# Patient Record
Sex: FEMALE | URBAN - METROPOLITAN AREA
[De-identification: names, ages, dates, MRNs, and addresses within clinical notes are randomized per-mention and may not be internally consistent; named-entity substitution may affect disease eponyms.]

---

## 2023-11-29 ENCOUNTER — APPOINTMENT (RX ONLY)
Dept: URBAN - METROPOLITAN AREA CLINIC 66 | Facility: CLINIC | Age: 63
Setting detail: DERMATOLOGY
End: 2023-11-29

## 2023-11-29 DIAGNOSIS — Z41.9 ENCOUNTER FOR PROCEDURE FOR PURPOSES OTHER THAN REMEDYING HEALTH STATE, UNSPECIFIED: ICD-10-CM

## 2023-11-29 PROCEDURE — ? COSMETIC CONSULTATION: BOTOX

## 2023-11-29 PROCEDURE — ? COSMETIC CONSULTATION: FILLERS

## 2023-11-29 PROCEDURE — ? COSMETIC CONSULTATION: DYSPORT

## 2023-11-29 PROCEDURE — ? DYSPORT

## 2023-11-29 ASSESSMENT — LOCATION DETAILED DESCRIPTION DERM
LOCATION DETAILED: LEFT CENTRAL BUCCAL CHEEK
LOCATION DETAILED: LEFT CHIN
LOCATION DETAILED: RIGHT CENTRAL BUCCAL CHEEK

## 2023-11-29 ASSESSMENT — LOCATION SIMPLE DESCRIPTION DERM
LOCATION SIMPLE: LEFT CHEEK
LOCATION SIMPLE: RIGHT CHEEK
LOCATION SIMPLE: CHIN

## 2023-11-29 ASSESSMENT — LOCATION ZONE DERM: LOCATION ZONE: FACE

## 2023-11-29 NOTE — PROCEDURE: DYSPORT
Detail Level: Detailed
Lcl Root Units: 0
Price (Use Numbers Only, No Special Characters Or $): 12
Show Right And Left Brow Units: No
Show Lateral Platysmal Band Units: Yes
Expiration Date (Month Year): 06/30/2025
Additional Area 4 Location: levator labii superioris
Dilution (U/0.1 Cc): 2.5
Additional Area 3 Location: superior orbicularis oris
Lot #: [id number] BOX C
Additional Area 2 Location: inferior orbicularis oculi
Depressor Anguli Oris Units: 4
Post-Care Instructions: No strenuous activity for 24 hours. Do not lie down for 4 hours post-injection. Do not massage or apply pressure to areas of injection. \\nFollow up in 2 weeks for re-evaluation and possible touch-up. Call or come in prior to that time with any questions or concerns.
Additional Area 6 Location: [facility]
Consent: All questions answered.\\n\\n10 units / 30 units DYS
Additional Area 5 Units: 6
Additional Area 5 Location: [facility]

## 2024-04-10 ENCOUNTER — APPOINTMENT (RX ONLY)
Dept: URBAN - METROPOLITAN AREA CLINIC 66 | Facility: CLINIC | Age: 64
Setting detail: DERMATOLOGY
End: 2024-04-10

## 2024-04-10 DIAGNOSIS — Z41.9 ENCOUNTER FOR PROCEDURE FOR PURPOSES OTHER THAN REMEDYING HEALTH STATE, UNSPECIFIED: ICD-10-CM

## 2024-04-10 DIAGNOSIS — I78.8 OTHER DISEASES OF CAPILLARIES: ICD-10-CM

## 2024-04-10 PROCEDURE — ? ELECTRODESICCATION (COSMETIC)

## 2024-04-10 PROCEDURE — ? JUVEDERM VOLUMA XC INJECTION

## 2024-04-10 PROCEDURE — ? DYSPORT

## 2024-04-10 ASSESSMENT — LOCATION SIMPLE DESCRIPTION DERM
LOCATION SIMPLE: RIGHT CHEEK
LOCATION SIMPLE: CHIN
LOCATION SIMPLE: GLABELLA
LOCATION SIMPLE: LEFT CHEEK

## 2024-04-10 ASSESSMENT — LOCATION DETAILED DESCRIPTION DERM
LOCATION DETAILED: GLABELLA
LOCATION DETAILED: LEFT MEDIAL MALAR CHEEK
LOCATION DETAILED: RIGHT CENTRAL MALAR CHEEK
LOCATION DETAILED: LEFT CENTRAL MALAR CHEEK
LOCATION DETAILED: LEFT CHIN

## 2024-04-10 ASSESSMENT — LOCATION ZONE DERM: LOCATION ZONE: FACE

## 2024-04-10 NOTE — PROCEDURE: ELECTRODESICCATION (COSMETIC)
Detail Level: Zone
Price (Use Numbers Only, No Special Characters Or $): 75
Post-Care Instructions: I reviewed with the patient in detail post-care instructions. Patient is to wear sun protection SPF 50 or greater with physical block, either zinc or titanium dioxide, and avoid picking at any of the treated lesions. Pt may apply Aquaphor to crusted or scabbing areas\\n\\nReviewed importance of sunscreen use and sun avoidance. Nothing to area for at least 1 hour following treatment\\nNo strenuous activity for 48 hours.\\n\\n0.5 utilized for today's treatment.  Apply Aquaphor nightly after gentle cleanser and in the morning as desired.
Nicholas: 0.5
Consent: The patient's consent was obtained including but not limited to risks of crusting, scabbing, blistering, scarring, darker or lighter pigmentary change, recurrence, incomplete removal and infection\\n\\nHyfrecator right medial malar solar lentigo and bilateral telangiectasias to nares

## 2024-04-10 NOTE — PROCEDURE: DYSPORT
Lcl Root Units: 0
Show Nasal Units: Yes
Price (Use Numbers Only, No Special Characters Or $): 12
Glabellar Complex Units: 16
Additional Area 5 Location: [facility]
Post-Care Instructions: No strenuous activity for 24 hours. Do not lie down for 4 hours post-injection. Do not massage or apply pressure to areas of injection. \\nFollow up in 2 weeks for re-evaluation and possible touch-up. Call or come in prior to that time with any questions or concerns.
Show Ucl Units: No
Consent: All questions answered.
Additional Area 1 Location: [facility]
Additional Area 2 Location: inferior orbicularis oculi
Depressor Anguli Oris Units: 4
Detail Level: Detailed
Lot #: [id number] box 3
Dilution (U/0.1 Cc): 2.5
Additional Area 3 Location: superior orbicularis oris
Additional Area 4 Location: levator labii superioris
Expiration Date (Month Year): 08/31/2025

## 2024-04-10 NOTE — PROCEDURE: JUVEDERM VOLUMA XC INJECTION
Cheeks Filler Volume In Cc: 1
Procedural Text: The filler was administered to the treatment areas noted above.\\n\\nNOTE: benzocaine component is 20%, not 30%
Brows Filler Volume In Cc: 0
Include Cannula Size?: 25G
Topical Anesthesia?: BLT cream (benzocaine 30%, lidocaine 12%, tetracaine 6%)
Lot #: [id number] box transfer
Consent: Written consent obtained. Risks include but not limited to bruising, ulceration, tissue necrosis, blindness, beading, irregular texture, ulceration, infection, allergic reaction, scar formation, incomplete augmentation, temporary nature, procedural pain.\\nVascular occlusion leading to blindness, tissue necrosis and ulceration
Detail Level: Detailed
Filler: Juvederm Voluma XC
Expiration Date (Month Year): 2024-07-18
Additional Area 2 Location: upper and lower lip
Include Cannula Information In Note?: No
Map Statment: See Attach Map for Complete Details
Post-Care Instructions: Patient applied ice immediately after fill. Instructed to apply ice to reduce swelling once more tonight prior to bed for 5-10 minutes. Sleep elevated on 1-2 extra pillows for the next 2-3 nights. \\nNo strenuous activity for 24 hours. Follow up in 2 weeks for re-evaluation. Call or come in prior to that time with any questions or concerns.
Additional Area 3 Location: bilateral, lateral, superior to vermillion border of upper lip
Include Cannula Length?: 1.5 inch
Price (Use Numbers Only, No Special Characters Or $): 483
Include Cannula Brand?: DermaSculpt
Additional Area 1 Location: chin
Additional Anesthesia Volume In Cc: 6

## 2024-04-24 ENCOUNTER — APPOINTMENT (RX ONLY)
Dept: URBAN - METROPOLITAN AREA CLINIC 66 | Facility: CLINIC | Age: 64
Setting detail: DERMATOLOGY
End: 2024-04-24

## 2024-04-24 DIAGNOSIS — I78.8 OTHER DISEASES OF CAPILLARIES: ICD-10-CM

## 2024-04-24 PROCEDURE — ? ELECTRODESICCATION (COSMETIC)

## 2024-04-24 ASSESSMENT — LOCATION DETAILED DESCRIPTION DERM
LOCATION DETAILED: LEFT NASAL SIDEWALL
LOCATION DETAILED: NASAL SUPRATIP

## 2024-04-24 ASSESSMENT — LOCATION ZONE DERM: LOCATION ZONE: NOSE

## 2024-04-24 ASSESSMENT — LOCATION SIMPLE DESCRIPTION DERM
LOCATION SIMPLE: LEFT NOSE
LOCATION SIMPLE: NOSE

## 2024-04-24 NOTE — PROCEDURE: ELECTRODESICCATION (COSMETIC)
Nicholas: 0.7
Price (Use Numbers Only, No Special Characters Or $): 50
Post-Care Instructions: I reviewed with the patient in detail post-care instructions. Patient is to wear sun protection SPF 50 or greater with physical block, either zinc or titanium dioxide, and avoid picking at any of the treated lesions. Pt may apply Aquaphor to crusted or scabbing areas\\n\\nReviewed importance of sunscreen use and sun avoidance. Nothing to area for at least 1 hour following treatment\\nNo strenuous activity for 48 hours.\\n\\n0.6 and 0.7 utilized for today's treatment.  Apply Aquaphor nightly after gentle cleanser and in the morning as desired.
Consent: The patient's consent was obtained including but not limited to risks of crusting, scabbing, blistering, scarring, darker or lighter pigmentary change, recurrence, incomplete removal and infection\\n\\nHyfrecator for seborrheic hyperplasia\\nSecond stage today. Paid last visit
Detail Level: Zone

## 2024-12-11 ENCOUNTER — APPOINTMENT (OUTPATIENT)
Dept: URBAN - METROPOLITAN AREA CLINIC 66 | Facility: CLINIC | Age: 64
Setting detail: DERMATOLOGY
End: 2024-12-11

## 2024-12-11 DIAGNOSIS — Z41.9 ENCOUNTER FOR PROCEDURE FOR PURPOSES OTHER THAN REMEDYING HEALTH STATE, UNSPECIFIED: ICD-10-CM

## 2024-12-11 PROCEDURE — ? DYSPORT

## 2024-12-11 ASSESSMENT — LOCATION DETAILED DESCRIPTION DERM
LOCATION DETAILED: LEFT UPPER CUTANEOUS LIP
LOCATION DETAILED: LEFT LOWER CUTANEOUS LIP

## 2024-12-11 ASSESSMENT — LOCATION ZONE DERM: LOCATION ZONE: LIP

## 2024-12-11 ASSESSMENT — LOCATION SIMPLE DESCRIPTION DERM: LOCATION SIMPLE: LEFT LIP

## 2024-12-11 NOTE — PROCEDURE: DYSPORT
Additional Comments: 2 units/ 6 DYS FROM BOX CREAMED CORN 14 total units
Additional Area 5 Location: [facility]
Lcl Root Units: 0
Show Levator Superior Units: Yes
Price (Use Numbers Only, No Special Characters Or $): 12
Expiration Date (Month Year): 05/31/2026
Show Right And Left Brow Units: No
Additional Area 4 Location: levator labii superioris
Additional Area 3 Location: superior orbicularis oris
Dilution (U/0.1 Cc): 2.5
Lot #: [id number] box creamed corn and mashed potatoes
Detail Level: Detailed
Additional Area 2 Location: inferior orbicularis oculi
Additional Area 6 Location: [facility]
Post-Care Instructions: No strenuous activity for 24 hours. Do not lie down for 4 hours post-injection. Do not massage or apply pressure to areas of injection. \\nFollow up in 2 weeks for re-evaluation and possible touch-up. Call or come in prior to that time with any questions or concerns.
Additional Area 1 Location: nasalis
Consent: All questions answered.